# Patient Record
Sex: MALE | Race: WHITE | NOT HISPANIC OR LATINO | URBAN - METROPOLITAN AREA
[De-identification: names, ages, dates, MRNs, and addresses within clinical notes are randomized per-mention and may not be internally consistent; named-entity substitution may affect disease eponyms.]

---

## 2021-09-10 ENCOUNTER — OUTPATIENT (OUTPATIENT)
Dept: OUTPATIENT SERVICES | Facility: HOSPITAL | Age: 51
LOS: 1 days | End: 2021-09-10
Payer: COMMERCIAL

## 2021-09-10 VITALS
HEIGHT: 72 IN | DIASTOLIC BLOOD PRESSURE: 76 MMHG | OXYGEN SATURATION: 94 % | SYSTOLIC BLOOD PRESSURE: 110 MMHG | HEART RATE: 85 BPM | TEMPERATURE: 98 F | WEIGHT: 199.08 LBS | RESPIRATION RATE: 15 BRPM

## 2021-09-10 DIAGNOSIS — Z01.818 ENCOUNTER FOR OTHER PREPROCEDURAL EXAMINATION: ICD-10-CM

## 2021-09-10 DIAGNOSIS — M20.22 HALLUX RIGIDUS, LEFT FOOT: ICD-10-CM

## 2021-09-10 DIAGNOSIS — Z98.890 OTHER SPECIFIED POSTPROCEDURAL STATES: Chronic | ICD-10-CM

## 2021-09-10 LAB
ANION GAP SERPL CALC-SCNC: 13 MMOL/L — SIGNIFICANT CHANGE UP (ref 5–17)
BUN SERPL-MCNC: 20 MG/DL — SIGNIFICANT CHANGE UP (ref 7–23)
CALCIUM SERPL-MCNC: 8.8 MG/DL — SIGNIFICANT CHANGE UP (ref 8.4–10.5)
CHLORIDE SERPL-SCNC: 104 MMOL/L — SIGNIFICANT CHANGE UP (ref 96–108)
CO2 SERPL-SCNC: 22 MMOL/L — SIGNIFICANT CHANGE UP (ref 22–31)
CREAT SERPL-MCNC: 1.11 MG/DL — SIGNIFICANT CHANGE UP (ref 0.5–1.3)
GLUCOSE SERPL-MCNC: 105 MG/DL — HIGH (ref 70–99)
HCT VFR BLD CALC: 40.9 % — SIGNIFICANT CHANGE UP (ref 39–50)
HGB BLD-MCNC: 14.1 G/DL — SIGNIFICANT CHANGE UP (ref 13–17)
MCHC RBC-ENTMCNC: 32.2 PG — SIGNIFICANT CHANGE UP (ref 27–34)
MCHC RBC-ENTMCNC: 34.5 GM/DL — SIGNIFICANT CHANGE UP (ref 32–36)
MCV RBC AUTO: 93.4 FL — SIGNIFICANT CHANGE UP (ref 80–100)
NRBC # BLD: 0 /100 WBCS — SIGNIFICANT CHANGE UP (ref 0–0)
PLATELET # BLD AUTO: 256 K/UL — SIGNIFICANT CHANGE UP (ref 150–400)
POTASSIUM SERPL-MCNC: 3.6 MMOL/L — SIGNIFICANT CHANGE UP (ref 3.5–5.3)
POTASSIUM SERPL-SCNC: 3.6 MMOL/L — SIGNIFICANT CHANGE UP (ref 3.5–5.3)
RBC # BLD: 4.38 M/UL — SIGNIFICANT CHANGE UP (ref 4.2–5.8)
RBC # FLD: 12.6 % — SIGNIFICANT CHANGE UP (ref 10.3–14.5)
SODIUM SERPL-SCNC: 139 MMOL/L — SIGNIFICANT CHANGE UP (ref 135–145)
WBC # BLD: 6.73 K/UL — SIGNIFICANT CHANGE UP (ref 3.8–10.5)
WBC # FLD AUTO: 6.73 K/UL — SIGNIFICANT CHANGE UP (ref 3.8–10.5)

## 2021-09-10 PROCEDURE — 85027 COMPLETE CBC AUTOMATED: CPT

## 2021-09-10 PROCEDURE — 80048 BASIC METABOLIC PNL TOTAL CA: CPT

## 2021-09-10 PROCEDURE — G0463: CPT

## 2021-09-10 NOTE — H&P PST ADULT - NSICDXPASTMEDICALHX_GEN_ALL_CORE_FT
PAST MEDICAL HISTORY:  Arthritis     Environmental allergies      PAST MEDICAL HISTORY:  Arthritis     Current smoker     Environmental allergies

## 2021-09-10 NOTE — H&P PST ADULT - NSANTHOSAYNRD_GEN_A_CORE
No. DAX screening performed.  STOP BANG Legend: 0-2 = LOW Risk; 3-4 = INTERMEDIATE Risk; 5-8 = HIGH Risk

## 2021-09-10 NOTE — H&P PST ADULT - PROBLEM SELECTOR PLAN 1
LEFT FOOT CHEILECTOMY  Pre-op education provided - all questions answered   Chlorhex soap & instructions given  CBC & BMP sent in PST

## 2021-09-10 NOTE — H&P PST ADULT - CARDIOVASCULAR DETAILS
Requested Prescriptions     Pending Prescriptions Disp Refills    valsartan-hydroCHLOROthiazide (DIOVAN-HCT) 320-25 MG per tablet [Pharmacy Med Name: VALSARTAN-HCTZ 320-25 MG TAB] 90 tablet 0     Sig: TAKE ONE TABLET BY MOUTH DAILY    amLODIPine (NORVASC) 5 MG tablet [Pharmacy Med Name: amLODIPine BESYLATE 5 MG TAB] 90 tablet 0     Sig: TAKE ONE TABLET BY MOUTH DAILY    atorvastatin (LIPITOR) 20 MG tablet [Pharmacy Med Name: ATORVASTATIN 20 MG TABLET] 90 tablet 0     Sig: TAKE ONE TABLET BY MOUTH DAILY     LAST OV 12/10/20  LAST LABS 07/07/20
positive S1/positive S2

## 2021-09-10 NOTE — H&P PST ADULT - HISTORY OF PRESENT ILLNESS
plantar fascitis, stiffness, pain, causing left side hip & knee problems    ***Covid test 9/14/2021 at [facility]. 50 YO M PMH osteoarthritis & joint pain, c/o left foot plantar fasciitis, stiffness & pain for ~1.5 years now causing misalignment, left side hip & knee problems, presents to [facility] for LEFT FOOT CHEILECTOMY 9/17/2021. Denies any palpitations, SOB, N/V, Covid symptoms (fever, chills, cough) or exposure.     ***Covid test 9/14/2021 at [facility].

## 2021-09-14 ENCOUNTER — OUTPATIENT (OUTPATIENT)
Dept: OUTPATIENT SERVICES | Facility: HOSPITAL | Age: 51
LOS: 1 days | End: 2021-09-14
Payer: COMMERCIAL

## 2021-09-14 DIAGNOSIS — Z11.52 ENCOUNTER FOR SCREENING FOR COVID-19: ICD-10-CM

## 2021-09-14 DIAGNOSIS — Z98.890 OTHER SPECIFIED POSTPROCEDURAL STATES: Chronic | ICD-10-CM

## 2021-09-14 PROBLEM — Z91.09 OTHER ALLERGY STATUS, OTHER THAN TO DRUGS AND BIOLOGICAL SUBSTANCES: Chronic | Status: ACTIVE | Noted: 2021-09-10

## 2021-09-14 PROBLEM — M19.90 UNSPECIFIED OSTEOARTHRITIS, UNSPECIFIED SITE: Chronic | Status: ACTIVE | Noted: 2021-09-10

## 2021-09-14 PROBLEM — F17.200 NICOTINE DEPENDENCE, UNSPECIFIED, UNCOMPLICATED: Chronic | Status: ACTIVE | Noted: 2021-09-10

## 2021-09-14 LAB — SARS-COV-2 RNA SPEC QL NAA+PROBE: SIGNIFICANT CHANGE UP

## 2021-09-14 PROCEDURE — U0003: CPT

## 2021-09-14 PROCEDURE — C9803: CPT

## 2021-09-14 PROCEDURE — U0005: CPT

## 2021-09-16 ENCOUNTER — TRANSCRIPTION ENCOUNTER (OUTPATIENT)
Age: 51
End: 2021-09-16

## 2021-09-17 ENCOUNTER — RESULT REVIEW (OUTPATIENT)
Age: 51
End: 2021-09-17

## 2021-09-17 ENCOUNTER — OUTPATIENT (OUTPATIENT)
Dept: OUTPATIENT SERVICES | Facility: HOSPITAL | Age: 51
LOS: 1 days | End: 2021-09-17
Payer: COMMERCIAL

## 2021-09-17 VITALS
SYSTOLIC BLOOD PRESSURE: 116 MMHG | RESPIRATION RATE: 16 BRPM | OXYGEN SATURATION: 96 % | TEMPERATURE: 97 F | DIASTOLIC BLOOD PRESSURE: 70 MMHG | HEART RATE: 75 BPM

## 2021-09-17 VITALS
RESPIRATION RATE: 15 BRPM | HEART RATE: 66 BPM | SYSTOLIC BLOOD PRESSURE: 117 MMHG | DIASTOLIC BLOOD PRESSURE: 76 MMHG | TEMPERATURE: 98 F | HEIGHT: 72 IN | WEIGHT: 199.08 LBS | OXYGEN SATURATION: 95 %

## 2021-09-17 DIAGNOSIS — Z98.890 OTHER SPECIFIED POSTPROCEDURAL STATES: Chronic | ICD-10-CM

## 2021-09-17 DIAGNOSIS — M20.22 HALLUX RIGIDUS, LEFT FOOT: ICD-10-CM

## 2021-09-17 PROCEDURE — 28289 CORRJ HALUX RIGDUS W/O IMPLT: CPT | Mod: LT

## 2021-09-17 PROCEDURE — 88300 SURGICAL PATH GROSS: CPT

## 2021-09-17 PROCEDURE — 73630 X-RAY EXAM OF FOOT: CPT

## 2021-09-17 PROCEDURE — 73630 X-RAY EXAM OF FOOT: CPT | Mod: 26,LT

## 2021-09-17 PROCEDURE — 88300 SURGICAL PATH GROSS: CPT | Mod: 26

## 2021-09-17 RX ORDER — SODIUM CHLORIDE 9 MG/ML
1000 INJECTION, SOLUTION INTRAVENOUS
Refills: 0 | Status: DISCONTINUED | OUTPATIENT
Start: 2021-09-17 | End: 2021-10-01

## 2021-09-17 RX ORDER — CETIRIZINE HYDROCHLORIDE 10 MG/1
1 TABLET ORAL
Qty: 0 | Refills: 0 | DISCHARGE

## 2021-09-17 RX ORDER — HYDROMORPHONE HYDROCHLORIDE 2 MG/ML
0.5 INJECTION INTRAMUSCULAR; INTRAVENOUS; SUBCUTANEOUS
Refills: 0 | Status: DISCONTINUED | OUTPATIENT
Start: 2021-09-17 | End: 2021-09-17

## 2021-09-17 RX ORDER — ONDANSETRON 8 MG/1
4 TABLET, FILM COATED ORAL ONCE
Refills: 0 | Status: DISCONTINUED | OUTPATIENT
Start: 2021-09-17 | End: 2021-10-01

## 2021-09-17 RX ORDER — CELECOXIB 200 MG/1
1 CAPSULE ORAL
Qty: 0 | Refills: 0 | DISCHARGE

## 2021-09-17 NOTE — ASU DISCHARGE PLAN (ADULT/PEDIATRIC) - CARE PROVIDER_API CALL
Kaelyn Burks (KAYDEN)  Surgery  [address] [place] [address]  Phone: [phone]  Fax: [phone]  Follow Up Time:

## 2021-10-06 LAB — SURGICAL PATHOLOGY STUDY: SIGNIFICANT CHANGE UP

## 2021-10-14 NOTE — ASU PATIENT PROFILE, ADULT - TEACHING/LEARNING FACTORS IMPACT ABILITY TO LEARN
Pt wants to wait to see how the abx works before making an appt.    He will call if needed.    Informed him RX would be sent in to [facility].   none

## 2022-07-20 PROBLEM — Z00.00 ENCOUNTER FOR PREVENTIVE HEALTH EXAMINATION: Status: ACTIVE | Noted: 2022-07-20

## 2022-07-22 ENCOUNTER — APPOINTMENT (OUTPATIENT)
Dept: ORTHOPEDIC SURGERY | Facility: CLINIC | Age: 52
End: 2022-07-22

## 2022-07-22 VITALS — HEIGHT: 72 IN | WEIGHT: 195 LBS | BODY MASS INDEX: 26.41 KG/M2

## 2022-07-22 PROCEDURE — 99214 OFFICE O/P EST MOD 30 MIN: CPT | Mod: 25

## 2022-07-22 PROCEDURE — 73080 X-RAY EXAM OF ELBOW: CPT | Mod: RT

## 2022-07-22 PROCEDURE — 20551 NJX 1 TENDON ORIGIN/INSJ: CPT

## 2022-07-22 NOTE — ASSESSMENT
[FreeTextEntry1] : We reviewed the history and his options.\par His questions were answered.\par A R lateral epicondyle injection is planned.\par PT rx.\par Should his sx persist, an MRI is an option.\par \par Procedure Name: Tendon Origin/Insertion Injection: Depomedrol, Lidocaine and Guidance Ultrasound\par \par Large Joint Injection was performed because of pain and inflammation.\par Depomedrol: An injection of Depomedrol 40 mg , 1 cc.\par Lidocaine: An injection of Lidocaine 1 mg , 4 cc.\par \par Medication was injected in the right lateral epicondyle. Patient has tried OTC's including aspirin, Ibuprofen, Aleve etc or prescription NSAIDS, and/or exercises at home and/ or physical therapy without satisfactory response. After verbal consent using sterile preparation and technique. The risks, benefits, and alternatives to cortisone injection were explained in full to the patient. Risks outlined include but are not limited to infection, sepsis, bleeding, scarring, skin discoloration, temporary increase in pain, syncopal episode, failure to resolve symptoms, allergic reaction, symptom recurrence, and elevation of blood sugar in diabetics. Patient understood the risks. All questions were answered. After discussion of options, patient requested an injection. Oral informed consent was obtained and sterile prep was done of the injection site. Sterile technique was utilized for the procedure including the preparation of the solutions used for the injection. Patient tolerated the procedure well. Advised to ice the injection site this evening. Prep with betadine locally to site. Sterile technique used. Patient tolerated procedure well. Post Procedure Instructions: Patient was advised to call if redness, pain, or fever occur and apply ice for 15 min. out of every hour for the next 12-24 hours as tolerated. Patient was advised to rest the joint(s) for 3 days. Ultrasound Guidance was used for the following reasons: for precise injection in area of tear. Visualization of the needle and placement of injection was performed without complication.\par

## 2022-07-22 NOTE — PHYSICAL EXAM
[Right] : right elbow [5___] : supination 5[unfilled]/5 [] : no tenderness over medial epicondyle [TWNoteComboBox7] : flexion 140 degrees [TWNoteComboBox4] : extension 0 degrees [TWNoteComboBox6] : pronation 80 degrees [de-identified] : supination 80 degrees

## 2022-07-22 NOTE — REASON FOR VISIT
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  No history.  He has pain making a tearing motion and using  a spray bottle.  No numbness.  Celebrex doesn't help.

## 2022-07-22 NOTE — HISTORY OF PRESENT ILLNESS
[8] : 8 [2] : 2 [Shooting] : shooting [Constant] : constant [Meds] : meds [] : no [FreeTextEntry1] : right elbow  [FreeTextEntry5] : pt states he started to have pain in his right elbow about 2 weeks ago, feels weakness in the arm  [FreeTextEntry7] : down the arm  [FreeTextEntry9] : Celebrex [de-identified] : movement

## 2022-08-29 ENCOUNTER — APPOINTMENT (OUTPATIENT)
Dept: ORTHOPEDIC SURGERY | Facility: CLINIC | Age: 52
End: 2022-08-29

## 2022-08-29 VITALS — BODY MASS INDEX: 26.41 KG/M2 | HEIGHT: 72 IN | WEIGHT: 195 LBS

## 2022-08-29 DIAGNOSIS — M77.11 LATERAL EPICONDYLITIS, RIGHT ELBOW: ICD-10-CM

## 2022-08-29 PROCEDURE — 99214 OFFICE O/P EST MOD 30 MIN: CPT

## 2022-08-29 RX ORDER — METHYLPREDNISOLONE 4 MG/1
4 TABLET ORAL
Qty: 1 | Refills: 0 | Status: ACTIVE | COMMUNITY
Start: 2022-08-29 | End: 1900-01-01

## 2022-08-29 NOTE — REASON FOR VISIT
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  No history.  He has pain making a tearing motion and using  a spray bottle.  No numbness.  Celebrex doesn't help.  The R lateral epicondyle injection on 7/22/22 which helped. He feels about 90% better since the last visit. He is still in PT and is making gains.

## 2022-08-29 NOTE — CONSULT LETTER
[Dear  ___] : Dear  [unfilled], [Consult Letter:] : I had the pleasure of evaluating your patient, [unfilled]. [Please see my note below.] : Please see my note below. [Consult Closing:] : Thank you very much for allowing me to participate in the care of this patient.  If you have any questions, please do not hesitate to contact me. [Sincerely,] : Sincerely, [FreeTextEntry3] : Chet Harris M.D.\par Shoulder Surgery

## 2022-08-29 NOTE — PHYSICAL EXAM
[Right] : right elbow [5___] : supination 5[unfilled]/5 [] : no tenderness over medial epicondyle [de-identified] : This is slight [de-identified] : There is slight discomfort with resisted wrist extension with the arm extended.  [TWNoteComboBox7] : flexion 140 degrees [TWNoteComboBox4] : extension 0 degrees [TWNoteComboBox6] : pronation 80 degrees [de-identified] : supination 80 degrees

## 2022-08-29 NOTE — ASSESSMENT
[FreeTextEntry1] : We discussed his course.\par MDP is prescribed.\par He has made reasonable gains.\par Further gains are expected. \par Questions answered. \par \par Patient seen by Chet Holman M.D.\par Entered by Lindsey Owens acting as scribe.

## 2022-08-29 NOTE — HISTORY OF PRESENT ILLNESS
[0] : 0 [de-identified] : pt is here today for a follow up for the right elbow. pt states his pain is much better than last visit , the injection helped a lot with the pain  [FreeTextEntry1] : right elbow

## 2023-03-17 ENCOUNTER — APPOINTMENT (OUTPATIENT)
Dept: ORTHOPEDIC SURGERY | Facility: CLINIC | Age: 53
End: 2023-03-17
Payer: COMMERCIAL

## 2023-03-17 VITALS — HEIGHT: 72 IN | BODY MASS INDEX: 27.09 KG/M2 | WEIGHT: 200 LBS

## 2023-03-17 PROCEDURE — 99213 OFFICE O/P EST LOW 20 MIN: CPT | Mod: 25

## 2023-03-17 PROCEDURE — 73562 X-RAY EXAM OF KNEE 3: CPT | Mod: LT

## 2023-03-17 PROCEDURE — 20610 DRAIN/INJ JOINT/BURSA W/O US: CPT

## 2023-03-17 RX ORDER — CELECOXIB 50 MG/1
CAPSULE ORAL
Refills: 0 | Status: ACTIVE | COMMUNITY

## 2023-03-17 NOTE — HISTORY OF PRESENT ILLNESS
[4] : 4 [3] : 3 [Dull/Aching] : dull/aching [Sharp] : sharp [Intermittent] : intermittent [Nothing helps with pain getting better] : Nothing helps with pain getting better [de-identified] : Has h/o OA left knee, had gel injections years ago. Uses Celebrex typically, stopped it and left knee swelled. Feels pain and tightness/weakness. No clicking, locking or giving way [] : no [FreeTextEntry1] : LT Knee [FreeTextEntry5] : LT Knee. Hx of OA

## 2023-03-17 NOTE — PHYSICAL EXAM
[NL (0)] : extension 0 degrees [5___] : hamstring 5[unfilled]/5 [Equivocal] : equivocal Richardson [Left] : left knee [AP] : anteroposterior [Mild tricompartmental OA medial narrowing] : Mild tricompartmental OA medial narrowing [] : negative Lachmann [TWNoteComboBox7] : flexion 125 degrees

## 2023-03-17 NOTE — PROCEDURE
[Left] : of the left [Knee] : knee [Pain] : pain [Alcohol] : alcohol [Betadine] : betadine [Ethyl Chloride sprayed topically] : ethyl chloride sprayed topically [Sterile technique used] : sterile technique used [___ cc    6mg] :  Betamethasone (Celestone) ~Vcc of 6mg [___ cc    1%] : Lidocaine ~Vcc of 1%  [Effusion] : effusion [de-identified] : 30cc [de-identified] : clear

## 2023-04-14 ENCOUNTER — APPOINTMENT (OUTPATIENT)
Dept: ORTHOPEDIC SURGERY | Facility: CLINIC | Age: 53
End: 2023-04-14

## 2024-01-15 ENCOUNTER — APPOINTMENT (OUTPATIENT)
Dept: ORTHOPEDIC SURGERY | Facility: CLINIC | Age: 54
End: 2024-01-15
Payer: COMMERCIAL

## 2024-01-15 VITALS — WEIGHT: 200 LBS | HEIGHT: 72 IN | BODY MASS INDEX: 27.09 KG/M2

## 2024-01-15 DIAGNOSIS — Z78.9 OTHER SPECIFIED HEALTH STATUS: ICD-10-CM

## 2024-01-15 PROCEDURE — 99214 OFFICE O/P EST MOD 30 MIN: CPT | Mod: 25

## 2024-01-15 PROCEDURE — 20611 DRAIN/INJ JOINT/BURSA W/US: CPT | Mod: LT

## 2024-01-15 PROCEDURE — 73564 X-RAY EXAM KNEE 4 OR MORE: CPT | Mod: LT

## 2024-01-15 PROCEDURE — J3490M: CUSTOM

## 2024-01-15 NOTE — IMAGING
[de-identified] : Varus deformity Mild effusion, no warmth, no ecchymosis Medial joint line tenderness to palpation  Range of motion 0-110 with associated crepitus 5/5 quadriceps and hamstring strength Ligamentously stable Motor and sensory intact distally Non antalgic gait Negative Elliot [Left] : left knee [All Views] : anteroposterior, lateral, skyline, and anteroposterior standing [Moderate tricompartmental OA medial narrowing] : Moderate tricompartmental OA medial narrowing

## 2024-01-15 NOTE — ASSESSMENT
[FreeTextEntry1] : Exacerbation of chronic left knee arthritis.  No repeat injection of his request today.  Good relief with injection in March with LIPPE.  Set up for Visco 3 for next visit if needed.  Discussed need for home exercise program and anti-inflammatory medication  -The patient's diagnosis was reviewed in detail. Explained this is a chronic, progressive deteriorating condition that may need treatment from time to time as the flare-ups occur.  -The natural progression of Osteoarthritis was explained to the patient. We discussed the possible treatment options from conservative to operative.  -We discussed prescription strength NSAIDs, Glucosamine and Chondroitin sulfate, and Physical Therapy as well different types of injections including viscosupplementation.  -We also discussed that at some point they may progress to needed a total knee replacement.  
Immediate family member

## 2024-01-15 NOTE — HISTORY OF PRESENT ILLNESS
[Gradual] : gradual [8] : 8 [6] : 6 [Dull/Aching] : dull/aching [Localized] : localized [Intermittent] : intermittent [Rest] : rest [Injection therapy] : injection therapy [Sitting] : sitting [Stairs] : stairs [de-identified] : 01/15/24 pt presents here today with left knee pain for couple of months  no injury pt had csi back in march by Dr Rai with some relief  [] : no [FreeTextEntry1] : left knee [FreeTextEntry5] : no injury  [FreeTextEntry6] : stiffness [de-identified] : Dr Rai  [de-identified] : csi

## 2024-01-16 ENCOUNTER — TRANSCRIPTION ENCOUNTER (OUTPATIENT)
Age: 54
End: 2024-01-16

## 2024-01-29 ENCOUNTER — APPOINTMENT (OUTPATIENT)
Dept: ORTHOPEDIC SURGERY | Facility: CLINIC | Age: 54
End: 2024-01-29
Payer: COMMERCIAL

## 2024-01-29 PROCEDURE — 99212 OFFICE O/P EST SF 10 MIN: CPT | Mod: 25

## 2024-01-29 PROCEDURE — 20611 DRAIN/INJ JOINT/BURSA W/US: CPT | Mod: LT

## 2024-01-29 NOTE — IMAGING
[de-identified] : Varus deformity Mild effusion, no warmth, no ecchymosis Medial joint line tenderness to palpation  Range of motion 0-110 with associated crepitus 5/5 quadriceps and hamstring strength Ligamentously stable Motor and sensory intact distally Non antalgic gait Negative Elliot

## 2024-01-29 NOTE — HISTORY OF PRESENT ILLNESS
[Gradual] : gradual [8] : 8 [6] : 6 [Dull/Aching] : dull/aching [Localized] : localized [Intermittent] : intermittent [Rest] : rest [Injection therapy] : injection therapy [Sitting] : sitting [Stairs] : stairs [de-identified] : 01/15/24 pt presents here today with left knee pain for couple of months  no injury pt had csi back in march by Dr Rai with some relief   1/29/24: Here for left knee Visco-3 injection #1.  [] : no [FreeTextEntry1] : left knee [FreeTextEntry5] : no injury  [FreeTextEntry6] : stiffness [de-identified] : Dr Rai  [de-identified] : csi

## 2024-01-29 NOTE — ASSESSMENT
[FreeTextEntry1] : DISCUSSED R/B/A OF VISCO-3 INJECTIONS AND PATIENT WISHES TO PROCEED.  LEFT KNEE VISCO-3 INJECTION #1 TODAY - TOLERATED WELL  DISCUSSED POST PROCEDURE RECOMMENDATIONS  RTC 1 WEEK TO CONTINUE SERIES

## 2024-01-29 NOTE — PROCEDURE
[FreeTextEntry3] : Large joint injection  was performed of the LEFT knee.  The indication for this procedure was pain, inflammation and x-ray evidence of Osteoarthritis on this or prior visit. The site was prepped with alcohol, betadine, ethyl chloride sprayed topically and sterile technique used.  An injection of VISCO-3 series; #1  was used.   Patient was advised to call if redness, pain or fever occur, apply ice for 15 minutes out of every hour for the next 12-24 hours as tolerated and patient was advised to rest the joint(s) for 2 days. Patient has tried OTC's including aspirin, Ibuprofen, Aleve, etc or prescription NSAIDS, and/or exercises at home and/or physical therapy without satisfactory response, patient had decreased mobility in the joint and the risks benefits, and alternatives have been discussed, and verbal consent was obtained.  Ultrasound guidance was indicated for this patient due to prior failure or difficult injection. All ultrasound images have been permanently captured and stored accordingly in our picture archiving and communication system. Visualization of the needle and placement of injection was performed without complication.

## 2024-02-05 ENCOUNTER — APPOINTMENT (OUTPATIENT)
Dept: ORTHOPEDIC SURGERY | Facility: CLINIC | Age: 54
End: 2024-02-05
Payer: COMMERCIAL

## 2024-02-05 PROCEDURE — 20611 DRAIN/INJ JOINT/BURSA W/US: CPT | Mod: LT

## 2024-02-05 PROCEDURE — 99212 OFFICE O/P EST SF 10 MIN: CPT | Mod: 25

## 2024-02-05 NOTE — HISTORY OF PRESENT ILLNESS
[de-identified] : 01/15/24 pt presents here today with left knee pain for couple of months  no injury pt had csi back in march by Dr Rai with some relief   1/29/24: Here for left knee Visco-3 injection #1.   2/5/24: Here for left knee visco-3 injection #2.

## 2024-02-05 NOTE — IMAGING
[de-identified] : Varus deformity Mild effusion, no warmth, no ecchymosis Medial joint line tenderness to palpation  Range of motion 0-110 with associated crepitus 5/5 quadriceps and hamstring strength Ligamentously stable Motor and sensory intact distally Non antalgic gait Negative Elliot

## 2024-02-05 NOTE — PROCEDURE
[FreeTextEntry3] : Large joint injection  was performed of the LEFT knee.  The indication for this procedure was pain, inflammation and x-ray evidence of Osteoarthritis on this or prior visit. The site was prepped with alcohol, betadine, ethyl chloride sprayed topically and sterile technique used.  An injection of VISCO-3 series; #2 was used.   Patient was advised to call if redness, pain or fever occur, apply ice for 15 minutes out of every hour for the next 12-24 hours as tolerated and patient was advised to rest the joint(s) for 2 days. Patient has tried OTC's including aspirin, Ibuprofen, Aleve, etc or prescription NSAIDS, and/or exercises at home and/or physical therapy without satisfactory response, patient had decreased mobility in the joint and the risks benefits, and alternatives have been discussed, and verbal consent was obtained.  Ultrasound guidance was indicated for this patient due to prior failure or difficult injection. All ultrasound images have been permanently captured and stored accordingly in our picture archiving and communication system. Visualization of the needle and placement of injection was performed without complication.

## 2024-02-05 NOTE — ASSESSMENT
[FreeTextEntry1] : DISCUSSED R/B/A OF VISCO-3 INJECTIONS AND PATIENT WISHES TO PROCEED.  LEFT KNEE VISCO-3 INJECTION #2 TODAY - TOLERATED WELL  DISCUSSED POST PROCEDURE RECOMMENDATIONS  RTC 1 WEEK TO CONTINUE SERIES

## 2024-02-12 ENCOUNTER — APPOINTMENT (OUTPATIENT)
Dept: ORTHOPEDIC SURGERY | Facility: CLINIC | Age: 54
End: 2024-02-12
Payer: COMMERCIAL

## 2024-02-12 PROCEDURE — 99212 OFFICE O/P EST SF 10 MIN: CPT | Mod: 25

## 2024-02-12 PROCEDURE — 20611 DRAIN/INJ JOINT/BURSA W/US: CPT | Mod: LT

## 2024-02-12 NOTE — ASSESSMENT
[FreeTextEntry1] : DISCUSSED R/B/A OF VISCO-3 INJECTIONS AND PATIENT WISHES TO PROCEED.  LEFT KNEE VISCO-3 INJECTION #3 TODAY - TOLERATED WELL  DISCUSSED POST PROCEDURE RECOMMENDATIONS  DISCUSSED TIMING OF REPEAT INJECTIONS - ON OR AFTER 8/13/24

## 2024-02-12 NOTE — IMAGING
[de-identified] : Varus deformity Mild effusion, no warmth, no ecchymosis Medial joint line tenderness to palpation  Range of motion 0-110 with associated crepitus 5/5 quadriceps and hamstring strength Ligamentously stable Motor and sensory intact distally Non antalgic gait Negative Elliot

## 2024-02-12 NOTE — HISTORY OF PRESENT ILLNESS
[de-identified] : 01/15/24 pt presents here today with left knee pain for couple of months  no injury pt had csi back in march by Dr Rai with some relief   1/29/24: Here for left knee Visco-3 injection #1.   2/5/24: Here for left knee visco-3 injection #2.   02/12/24 left knee visco 3 # 3

## 2024-02-12 NOTE — PROCEDURE
[FreeTextEntry3] : Large joint injection  was performed of the LEFT knee.  The indication for this procedure was pain, inflammation and x-ray evidence of Osteoarthritis on this or prior visit. The site was prepped with alcohol, betadine, ethyl chloride sprayed topically and sterile technique used.  An injection of VISCO-3 series; #3 was used.   Patient was advised to call if redness, pain or fever occur, apply ice for 15 minutes out of every hour for the next 12-24 hours as tolerated and patient was advised to rest the joint(s) for 2 days. Patient has tried OTC's including aspirin, Ibuprofen, Aleve, etc or prescription NSAIDS, and/or exercises at home and/or physical therapy without satisfactory response, patient had decreased mobility in the joint and the risks benefits, and alternatives have been discussed, and verbal consent was obtained.  Ultrasound guidance was indicated for this patient due to prior failure or difficult injection. All ultrasound images have been permanently captured and stored accordingly in our picture archiving and communication system. Visualization of the needle and placement of injection was performed without complication.

## 2024-04-29 ENCOUNTER — APPOINTMENT (OUTPATIENT)
Dept: ORTHOPEDIC SURGERY | Facility: CLINIC | Age: 54
End: 2024-04-29
Payer: COMMERCIAL

## 2024-04-29 DIAGNOSIS — M17.12 UNILATERAL PRIMARY OSTEOARTHRITIS, LEFT KNEE: ICD-10-CM

## 2024-04-29 PROCEDURE — 99214 OFFICE O/P EST MOD 30 MIN: CPT

## 2024-04-29 RX ORDER — MELOXICAM 15 MG/1
15 TABLET ORAL DAILY
Qty: 30 | Refills: 2 | Status: ACTIVE | COMMUNITY
Start: 2024-04-29 | End: 2024-07-28

## 2024-04-29 NOTE — HISTORY OF PRESENT ILLNESS
[de-identified] : 01/15/24 pt presents here today with left knee pain for couple of months  no injury pt had csi back in march by Dr Rai with some relief   1/29/24: Here for left knee Visco-3 injection #1.   2/5/24: Here for left knee visco-3 injection #2.   02/12/24 left knee visco 3 # 3  4/29/24: here for left knee follow up. No improvement since last visit.

## 2024-04-29 NOTE — ASSESSMENT
[FreeTextEntry1] : Presents with exacerbation of left knee arthritis.  Main complaint is mild swelling.  There is no pain today.  Will try switching to a different anti-inflammatory as he is currently on Celebrex and not working.  Until he develops pain I do not think an MRI or additional invasive treatments are warranted.  He will return in August to repeat Visco if needed.   The patient's current medication management of their orthopedic diagnosis was reviewed today:(1) We discussed a comprehensive treatment plan that included pharmaceutical management involving the use of prescription medications. (2) There is a moderate risk of morbidity with further treatment, especially from use of prescription strength medications and possible side effects of these medications which include upset stomach with oral medications, skin reactions to topical medications and cardiac/renal/diabetes issues with long term use. (3) I recommended that the patient follow-up with their medical physician to discuss any significant specific potential issues with long term medication use such as interactions with current medications or with exacerbation of underlying medical comorbidities. (4) The benefits and risks associated with use of injectable, oral or topical, prescription and over the counter anti-inflammatory medications were discussed with the patient. The patient voiced understanding of the risks including but not limited to bleeding, stroke, kidney dysfunction, heart disease, and were referred to the black box warning label for further information.

## 2024-04-29 NOTE — IMAGING
[de-identified] : Mild effusion, no warmth, no ecchymosis No tenderness to palpation  Range of motion 0-130 with mild anterior crepitus 5/5 quadriceps and hamstring strength Ligamentously stable Motor and sensory intact distally Non antalgic gait Negative Elilot